# Patient Record
Sex: MALE | Race: WHITE | Employment: FULL TIME | URBAN - METROPOLITAN AREA
[De-identification: names, ages, dates, MRNs, and addresses within clinical notes are randomized per-mention and may not be internally consistent; named-entity substitution may affect disease eponyms.]

---

## 2017-02-20 ENCOUNTER — APPOINTMENT (OUTPATIENT)
Dept: GENERAL RADIOLOGY | Age: 47
End: 2017-02-20
Attending: NEUROLOGICAL SURGERY
Payer: COMMERCIAL

## 2017-02-20 ENCOUNTER — HOSPITAL ENCOUNTER (OUTPATIENT)
Dept: GENERAL RADIOLOGY | Age: 47
Discharge: HOME | End: 2017-02-20
Attending: NEUROLOGICAL SURGERY | Admitting: RADIOLOGY
Payer: COMMERCIAL

## 2017-02-20 ENCOUNTER — APPOINTMENT (OUTPATIENT)
Dept: CT IMAGING | Age: 47
End: 2017-02-20
Attending: NEUROLOGICAL SURGERY
Payer: COMMERCIAL

## 2017-02-20 VITALS
DIASTOLIC BLOOD PRESSURE: 101 MMHG | HEIGHT: 72 IN | BODY MASS INDEX: 32.51 KG/M2 | SYSTOLIC BLOOD PRESSURE: 139 MMHG | TEMPERATURE: 97.8 F | WEIGHT: 240 LBS | HEART RATE: 71 BPM | RESPIRATION RATE: 18 BRPM | OXYGEN SATURATION: 98 %

## 2017-02-20 DIAGNOSIS — M54.12 CERVICAL RADICULOPATHY: ICD-10-CM

## 2017-02-20 PROCEDURE — 62302 MYELOGRAPHY LUMBAR INJECTION: CPT

## 2017-02-20 PROCEDURE — 72040 X-RAY EXAM NECK SPINE 2-3 VW: CPT

## 2017-02-20 PROCEDURE — 74011250637 HC RX REV CODE- 250/637: Performed by: RADIOLOGY

## 2017-02-20 PROCEDURE — 72126 CT NECK SPINE W/DYE: CPT

## 2017-02-20 PROCEDURE — 74011000250 HC RX REV CODE- 250: Performed by: NEUROLOGICAL SURGERY

## 2017-02-20 PROCEDURE — 74011636320 HC RX REV CODE- 636/320: Performed by: NEUROLOGICAL SURGERY

## 2017-02-20 RX ORDER — OXYMETAZOLINE HCL 0.05 %
2 SPRAY, NON-AEROSOL (ML) NASAL
Status: DISCONTINUED | OUTPATIENT
Start: 2017-02-20 | End: 2017-02-20 | Stop reason: HOSPADM

## 2017-02-20 RX ORDER — LIDOCAINE HYDROCHLORIDE 10 MG/ML
1-5 INJECTION INFILTRATION; PERINEURAL
Status: DISCONTINUED | OUTPATIENT
Start: 2017-02-20 | End: 2017-02-20

## 2017-02-20 RX ORDER — DIAZEPAM 5 MG/1
10 TABLET ORAL ONCE
Status: COMPLETED | OUTPATIENT
Start: 2017-02-20 | End: 2017-02-20

## 2017-02-20 RX ORDER — HYDROCODONE BITARTRATE AND ACETAMINOPHEN 7.5; 325 MG/1; MG/1
1 TABLET ORAL
Status: DISCONTINUED | OUTPATIENT
Start: 2017-02-20 | End: 2017-02-20 | Stop reason: HOSPADM

## 2017-02-20 RX ORDER — LIDOCAINE HYDROCHLORIDE 10 MG/ML
1-5 INJECTION, SOLUTION EPIDURAL; INFILTRATION; INTRACAUDAL; PERINEURAL
Status: COMPLETED | OUTPATIENT
Start: 2017-02-20 | End: 2017-02-20

## 2017-02-20 RX ORDER — ACETAMINOPHEN 325 MG/1
650 TABLET ORAL
Status: DISCONTINUED | OUTPATIENT
Start: 2017-02-20 | End: 2017-02-20 | Stop reason: HOSPADM

## 2017-02-20 RX ADMIN — LIDOCAINE HYDROCHLORIDE 5 ML: 10 INJECTION, SOLUTION EPIDURAL; INFILTRATION; INTRACAUDAL; PERINEURAL at 10:28

## 2017-02-20 RX ADMIN — HYDROCODONE BITARTRATE AND ACETAMINOPHEN 1 TABLET: 7.5; 325 TABLET ORAL at 11:54

## 2017-02-20 RX ADMIN — DIAZEPAM 10 MG: 5 TABLET ORAL at 10:08

## 2017-02-20 RX ADMIN — OXYMETAZOLINE HYDROCHLORIDE 2 SPRAY: 5 SPRAY NASAL at 11:45

## 2017-02-20 RX ADMIN — IOPAMIDOL 15 ML: 612 INJECTION, SOLUTION INTRATHECAL at 10:28

## 2017-02-20 RX ADMIN — SODIUM BICARBONATE 5 ML: 0.2 INJECTION, SOLUTION INTRAVENOUS at 10:28

## 2017-02-20 NOTE — IP AVS SNAPSHOT
Sreedhar Mcmillan 
 
 
 [address] [facility] Patient: Ildefonso Vera. MRN: [id number] LSP:[id number] You are allergic to the following No active allergies Recent Documentation Height Weight BMI Smoking Status 1.829 m 108.9 kg 32.55 kg/m2 Current Every Day Smoker Emergency Contacts Name Discharge Info Relation Home Work Mobile Carolina Zhu DISCHARGE CAREGIVER [3] Spouse [3]   [phone] About your hospitalization You were admitted on:  February 20, 2017 You last received care in the:  [address] [place] [address] Road You were discharged on:  February 20, 2017 Unit phone number:  [phone] Why you were hospitalized Your primary diagnosis was:  Not on File Providers Seen During Your Hospitalizations Provider Role Specialty Primary office phone Isatu Macedo MD Attending Provider Neurosurgery [phone] Your Primary Care Physician (PCP) Primary Care Physician Office Phone Office Fax [facility], [address] [place] [address] [phone] Follow-up Information Follow up With Details Comments Contact Info Hank Hernandez MD   [address] [place] [address] [phone] Current Discharge Medication List  
  
CONTINUE these medications which have NOT CHANGED Dose & Instructions Dispensing Information Comments Morning Noon Evening Bedtime  
 amitriptyline 50 mg tablet Commonly known as:  ELAVIL Your next dose is: Today, Tomorrow Other:  _________ Take  by mouth nightly. Refills:  0 HYDROcodone-acetaminophen  mg tablet Commonly known as:  Tab Dolphin Your next dose is: Today, Tomorrow Other:  _________ Dose:  1 Tab Take 1 Tab by mouth. Refills:  0 Discharge Instructions Valium (Diazepam) Discharge instructions You received the narcotic listed above. This medication was given to you to help decrease your anxiety/discomfort and allow you to complete your examination Common Side Effects: 
 
Impairment of memory for up to 24 hours, sleepiness, slurred speech, dizziness, visual disturbance such as blurred vision or difficulty focusing, confusion, euphoria (pain relief), chills, ears feeling blocked or dreaming. It is recommended that you do not drive or operate equipment for at least 24 hours. You should not drink any alcoholic beverages for at least 24 hours. Do not take any other muscle relaxers, sedatives, hypnotics, or mood altering medications for 24 hours unless ordered by your physician who is aware that you received this medication. Call your physician for any questions or problems. He/She may contact this Radiology Department for further information. Marlena Caro RN Myelogram: About This Test 
What is it? A myelogram uses X-rays and a special dye to make pictures of bones and nerves of the spine (spinal canal). The spinal canal holds the spinal cord, the spinal nerve roots, and the fluid-filled space between the bones in your spine. Why is this test done? A myelogram is done to check for: · The cause of arm or leg numbness, weakness, or pain. · Narrowing of the spinal canal (spinal stenosis). · A tumor or infection causing problems with the spinal cord or nerve roots. · A spinal disc that has ruptured (herniated disc). · Inflammation of the membrane that covers the brain and spinal cord. · Problems with the blood vessels to the spine. How can you prepare for the test? 
Your doctor will tell you if you need to change how much you eat and drink before the myelogram. You may be asked to increase the amount of water you drink before the test. Follow the instructions your doctor gives you about eating and drinking. Before a myelogram, tell your doctor if: 
· You are allergic to any medicines, contrast material, or iodine dye. · You have had bleeding problems, or you take a blood thinner, such as aspirin, clopidogrel (Plavix), or warfarin (Coumadin), or you take over-the-counter medicines, such as ibuprofen (Advil, Motrin) or naproxen (Aleve). Your doctor will tell you when you should stop taking these medicines several days before your procedure. Make sure that you understand exactly what he or she wants you to do. · You have had kidney problems. · You have diabetes, especially if you take metformin (Glucophage, for example). · You are or might be pregnant. Ask someone to take you home and stay with you after the test. 
What happens during the test? 
The dye is put into your spinal canal with a thin needle. This is called a lumbar puncture. The dye moves through the space so the nerve roots and spinal cord can be seen more clearly. After the dye is put in, you will lie still while the X-ray pictures are taken. How does it feel? You will feel a quick sting from a small needle that has medicine to numb the skin on your back. You will also feel some pressure as the long, thin spinal needle is put into your spinal canal. You may feel a quick sharp pain down your buttock or leg when the needle is moved in your spine. The dye may make you feel warm and flushed and have a metallic taste in your mouth. What else should you know about the test? 
In rare cases, the hole made by the needle in the sac around the spine does not close normally. This can allow spinal fluid to leak out. This leak may need to be repaired through a procedure called an epidural blood patch. To do the patch, your doctor injects some of your own blood to cover the hole. How long does the test take? · A myelogram usually takes 30 minutes to 1 hour.  
What happens after the test? 
· You will probably be able to go home 30 minutes to 2 hours after the test. 
 · You may need to lie in bed with your head raised for 4 to 24 hours after the test. To prevent seizures, which are a rare side effect, do not bend over or lie down with your head lower than your body. Keeping your head higher than your body after a myelogram also may help prevent or reduce other side effects, such as headache, nausea, or vomiting. · Avoid strenuous activity, such as running or heavy lifting, for at least 1 day after your myelogram. 
· Drink plenty of water after the myelogram. Your doctor will give you instructions on taking your regular medicines. When should you call for help? Call 911 anytime you think you may need emergency care. For example, call if: 
· You have a seizure. Call your doctor now or seek immediate medical care if: 
· You have any increase in pain, weakness, or numbness in your legs. · You have a severe headache or stiff neck, or your eyes become very sensitive to light. · You have a headache that lasts longer than 24 hours. · You have problems urinating or having a bowel movement. · You have a fever. Follow-up care is a key part of your treatment and safety. Be sure to make and go to all appointments, and call your doctor if you are having problems. It's also a good idea to keep a list of the medicines you take. Ask your doctor when you can expect to have your test results. Where can you learn more? Go to http://kumar-skinny.info/. Enter K130 in the search box to learn more about \"Myelogram: About This Test.\" Current as of: August 15, 2016 Content Version: 11.1 © 6017-6421 [employer]. Care instructions adapted under license by [employer] (which disclaims liability or warranty for this information). If you have questions about a medical condition or this instruction, always ask your healthcare professional. [address] any warranty or liability for your use of this information. DISCHARGE SUMMARY from Nurse The following personal items are in your possession at time of discharge: 
 
Dental Appliances: None Visual Aid: None Home Medications: None Jewelry: None Clothing: Shirt, Hat, Pants, Undergarments, Footwear, Socks (all belongings given to wife) Other Valuables: None PATIENT INSTRUCTIONS: 
 
After general anesthesia or intravenous sedation, for 24 hours or while taking prescription Narcotics: · Limit your activities · Do not drive and operate hazardous machinery · Do not make important personal or business decisions · Do  not drink alcoholic beverages · If you have not urinated within 8 hours after discharge, please contact your surgeon on call. Report the following to your surgeon: 
· Excessive pain, swelling, redness or odor of or around the surgical area · Temperature over 100.5 · Nausea and vomiting lasting longer than 4 hours or if unable to take medications · Any signs of decreased circulation or nerve impairment to extremity: change in color, persistent  numbness, tingling, coldness or increase pain · Any questions What to do at Home: These are general instructions for a healthy lifestyle: No smoking/ No tobacco products/ Avoid exposure to second hand smoke Surgeon General's Warning:  Quitting smoking now greatly reduces serious risk to your health. Obesity, smoking, and sedentary lifestyle greatly increases your risk for illness A healthy diet, regular physical exercise & weight monitoring are important for maintaining a healthy lifestyle You may be retaining fluid if you have a history of heart failure or if you experience any of the following symptoms:  Weight gain of 3 pounds or more overnight or 5 pounds in a week, increased swelling in our hands or feet or shortness of breath while lying flat in bed. Please call your doctor as soon as you notice any of these symptoms; do not wait until your next office visit. Recognize signs and symptoms of STROKE: 
 
F-face looks uneven A-arms unable to move or move unevenly S-speech slurred or non-existent T-time-call 911 as soon as signs and symptoms begin-DO NOT go Back to bed or wait to see if you get better-TIME IS BRAIN. Warning Signs of HEART ATTACK Call 911 if you have these symptoms: 
? Chest discomfort. Most heart attacks involve discomfort in the center of the chest that lasts more than a few minutes, or that goes away and comes back. It can feel like uncomfortable pressure, squeezing, fullness, or pain. ? Discomfort in other areas of the upper body. Symptoms can include pain or discomfort in one or both arms, the back, neck, jaw, or stomach. ? Shortness of breath with or without chest discomfort. ? Other signs may include breaking out in a cold sweat, nausea, or lightheadedness. Don't wait more than five minutes to call [address]! Fast action can save your life. Calling 911 is almost always the fastest way to get lifesaving treatment. Emergency Medical Services staff can begin treatment when they arrive  up to an hour sooner than if someone gets to the hospital by car. The discharge information has been reviewed with the patient. The patient verbalized understanding. Discharge medications reviewed with the patient and appropriate educational materials and side effects teaching were provided. Patient armband removed and given to patient to take home. Patient was informed of the privacy risks if armband lost or stolen Discharge Orders None Introducing [facility] SERVICES! [facility] introduces WhistleTalk patient portal. Now you can access parts of your medical record, email your doctor's office, and request medication refills online. 1. In your internet browser, go to [email or web address] 2. Click on the First Time User? Click Here link in the Sign In box.  You will see the New Member Sign Up page. 3. Enter your Krikle Access Code exactly as it appears below. You will not need to use this code after youve completed the sign-up process. If you do not sign up before the expiration date, you must request a new code. · Krikle Access Code: [email or web address] Expires: 5/10/2017 10:33 AM 
 
4. Enter the last four digits of your Social Security Number (xxxx) and Date of Birth (mm/dd/yyyy) as indicated and click Submit. You will be taken to the next sign-up page. 5. Create a Krikle ID. This will be your Krikle login ID and cannot be changed, so think of one that is secure and easy to remember. 6. Create a Krikle password. You can change your password at any time. 7. Enter your Password Reset Question and Answer. This can be used at a later time if you forget your password. 8. Enter your e-mail address. You will receive e-mail notification when new information is available in [address] 9. Click Sign Up. You can now view and download portions of your medical record. 10. Click the Download Summary menu link to download a portable copy of your medical information. If you have questions, please visit the Frequently Asked Questions section of the Krikle website. Remember, Krikle is NOT to be used for urgent needs. For medical emergencies, dial 911. Now available from your iPhone and Android! General Information Please provide this summary of care documentation to your next provider. Patient Signature:  ____________________________________________________________ Date:  ____________________________________________________________  
  
Alexi Sport Provider Signature:  ____________________________________________________________ Date:  ____________________________________________________________

## 2017-02-20 NOTE — PROCEDURES
Vascular & Interventional Radiology Brief Procedure Note    Interventional Radiologist: Elisha Stokes MD    Pre-operative Diagnosis:  Neck Pain and numbness down both arms, left greater then right    Post-operative Diagnosis: Same as pre-op dx    Procedure(s) Performed:  Cervical Myelogram    Anesthesia:  Local    Findings:  Valium 10 mg po given prior to the exam.  22 G spinal needle placed at L 2-3 with fluoroscopic guidance. 10 cc of Isovue 300 M were instilled into the dural sac and advanced to the cervical region with gravity. Cervical myelographic imaging was performed, CT will follow, reported separately.     Complications: None    Estimated Blood Loss:  none    Tubes and Drains: None    Specimens: None    Condition: Good    Disposition:  [address] times 4 hours then home    Elsiha Stokes MD, MD  [address] [address]  Vascular & Interventional Radiology  2/20/2017

## 2017-02-20 NOTE — DISCHARGE INSTRUCTIONS
Valium (Diazepam) Discharge instructions    You received the narcotic listed above. This medication was given to you to help decrease your anxiety/discomfort and allow you to complete your examination       Common Side Effects:    Impairment of memory for up to 24 hours, sleepiness, slurred speech, dizziness, visual disturbance such as blurred vision or difficulty focusing, confusion, euphoria (pain relief), chills, ears feeling blocked or dreaming. It is recommended that you do not drive or operate equipment for at least 24 hours. You should not drink any alcoholic beverages for at least 24 hours. Do not take any other muscle relaxers, sedatives, hypnotics, or mood altering medications for 24 hours unless ordered by your physician who is aware that you received this medication. Call your physician for any questions or problems. He/She may contact this Radiology Department for further information. Jenelle Falk RN           Myelogram: About This Test  What is it? A myelogram uses X-rays and a special dye to make pictures of bones and nerves of the spine (spinal canal). The spinal canal holds the spinal cord, the spinal nerve roots, and the fluid-filled space between the bones in your spine. Why is this test done? A myelogram is done to check for:  · The cause of arm or leg numbness, weakness, or pain. · Narrowing of the spinal canal (spinal stenosis). · A tumor or infection causing problems with the spinal cord or nerve roots. · A spinal disc that has ruptured (herniated disc). · Inflammation of the membrane that covers the brain and spinal cord. · Problems with the blood vessels to the spine. How can you prepare for the test?  Your doctor will tell you if you need to change how much you eat and drink before the myelogram. You may be asked to increase the amount of water you drink before the test. Follow the instructions your doctor gives you about eating and drinking.   Before a myelogram, tell your doctor if:  · You are allergic to any medicines, contrast material, or iodine dye. · You have had bleeding problems, or you take a blood thinner, such as aspirin, clopidogrel (Plavix), or warfarin (Coumadin), or you take over-the-counter medicines, such as ibuprofen (Advil, Motrin) or naproxen (Aleve). Your doctor will tell you when you should stop taking these medicines several days before your procedure. Make sure that you understand exactly what he or she wants you to do. · You have had kidney problems. · You have diabetes, especially if you take metformin (Glucophage, for example). · You are or might be pregnant. Ask someone to take you home and stay with you after the test.  What happens during the test?  The dye is put into your spinal canal with a thin needle. This is called a lumbar puncture. The dye moves through the space so the nerve roots and spinal cord can be seen more clearly. After the dye is put in, you will lie still while the X-ray pictures are taken. How does it feel? You will feel a quick sting from a small needle that has medicine to numb the skin on your back. You will also feel some pressure as the long, thin spinal needle is put into your spinal canal. You may feel a quick sharp pain down your buttock or leg when the needle is moved in your spine. The dye may make you feel warm and flushed and have a metallic taste in your mouth. What else should you know about the test?  In rare cases, the hole made by the needle in the sac around the spine does not close normally. This can allow spinal fluid to leak out. This leak may need to be repaired through a procedure called an epidural blood patch. To do the patch, your doctor injects some of your own blood to cover the hole. How long does the test take? · A myelogram usually takes 30 minutes to 1 hour.   What happens after the test?  · You will probably be able to go home 30 minutes to 2 hours after the test.  · You may need to lie in bed with your head raised for 4 to 24 hours after the test. To prevent seizures, which are a rare side effect, do not bend over or lie down with your head lower than your body. Keeping your head higher than your body after a myelogram also may help prevent or reduce other side effects, such as headache, nausea, or vomiting. · Avoid strenuous activity, such as running or heavy lifting, for at least 1 day after your myelogram.  · Drink plenty of water after the myelogram. Your doctor will give you instructions on taking your regular medicines. When should you call for help? Call 911 anytime you think you may need emergency care. For example, call if:  · You have a seizure. Call your doctor now or seek immediate medical care if:  · You have any increase in pain, weakness, or numbness in your legs. · You have a severe headache or stiff neck, or your eyes become very sensitive to light. · You have a headache that lasts longer than 24 hours. · You have problems urinating or having a bowel movement. · You have a fever. Follow-up care is a key part of your treatment and safety. Be sure to make and go to all appointments, and call your doctor if you are having problems. It's also a good idea to keep a list of the medicines you take. Ask your doctor when you can expect to have your test results. Where can you learn more? Go to http://kumar-skinny.info/. Enter V942 in the search box to learn more about \"Myelogram: About This Test.\"  Current as of: August 15, 2016  Content Version: 11.1  © 0468-9389 [employer]. Care instructions adapted under license by [employer] (which disclaims liability or warranty for this information). If you have questions about a medical condition or this instruction, always ask your healthcare professional. [address] any warranty or liability for your use of this information.     DISCHARGE SUMMARY from Nurse    The following personal items are in your possession at time of discharge:    Dental Appliances: None  Visual Aid: None     Home Medications: None  Jewelry: None  Clothing: Shirt, Hat, Pants, Undergarments, Footwear, Socks (all belongings given to wife)  Other Valuables: None             PATIENT INSTRUCTIONS:    After general anesthesia or intravenous sedation, for 24 hours or while taking prescription Narcotics:  · Limit your activities  · Do not drive and operate hazardous machinery  · Do not make important personal or business decisions  · Do  not drink alcoholic beverages  · If you have not urinated within 8 hours after discharge, please contact your surgeon on call. Report the following to your surgeon:  · Excessive pain, swelling, redness or odor of or around the surgical area  · Temperature over 100.5  · Nausea and vomiting lasting longer than 4 hours or if unable to take medications  · Any signs of decreased circulation or nerve impairment to extremity: change in color, persistent  numbness, tingling, coldness or increase pain  · Any questions        What to do at Home:  These are general instructions for a healthy lifestyle:    No smoking/ No tobacco products/ Avoid exposure to second hand smoke    Surgeon General's Warning:  Quitting smoking now greatly reduces serious risk to your health. Obesity, smoking, and sedentary lifestyle greatly increases your risk for illness    A healthy diet, regular physical exercise & weight monitoring are important for maintaining a healthy lifestyle    You may be retaining fluid if you have a history of heart failure or if you experience any of the following symptoms:  Weight gain of 3 pounds or more overnight or 5 pounds in a week, increased swelling in our hands or feet or shortness of breath while lying flat in bed. Please call your doctor as soon as you notice any of these symptoms; do not wait until your next office visit.     Recognize signs and symptoms of STROKE:    F-face looks uneven    A-arms unable to move or move unevenly    S-speech slurred or non-existent    T-time-call 911 as soon as signs and symptoms begin-DO NOT go       Back to bed or wait to see if you get better-TIME IS BRAIN. Warning Signs of HEART ATTACK     Call 911 if you have these symptoms:   Chest discomfort. Most heart attacks involve discomfort in the center of the chest that lasts more than a few minutes, or that goes away and comes back. It can feel like uncomfortable pressure, squeezing, fullness, or pain.  Discomfort in other areas of the upper body. Symptoms can include pain or discomfort in one or both arms, the back, neck, jaw, or stomach.  Shortness of breath with or without chest discomfort.  Other signs may include breaking out in a cold sweat, nausea, or lightheadedness. Don't wait more than five minutes to call 911 - MINUTES MATTER! Fast action can save your life. Calling 911 is almost always the fastest way to get lifesaving treatment. Emergency Medical Services staff can begin treatment when they arrive -- up to an hour sooner than if someone gets to the hospital by car. The discharge information has been reviewed with the patient. The patient verbalized understanding. Discharge medications reviewed with the patient and appropriate educational materials and side effects teaching were provided. Patient armband removed and given to patient to take home.   Patient was informed of the privacy risks if armband lost or stolen

## 2017-11-08 PROBLEM — M54.12 CERVICAL RADICULAR PAIN: Status: ACTIVE | Noted: 2017-11-08

## 2023-12-13 ENCOUNTER — NEW PATIENT (OUTPATIENT)
Dept: URBAN - METROPOLITAN AREA CLINIC 2 | Facility: CLINIC | Age: 53
End: 2023-12-13

## 2023-12-13 DIAGNOSIS — D23.112: ICD-10-CM

## 2023-12-13 DIAGNOSIS — H43.393: ICD-10-CM

## 2023-12-13 DIAGNOSIS — H25.13: ICD-10-CM

## 2023-12-13 DIAGNOSIS — D23.122: ICD-10-CM

## 2023-12-13 PROCEDURE — 92004 COMPRE OPH EXAM NEW PT 1/>: CPT

## 2023-12-13 PROCEDURE — 92015 DETERMINE REFRACTIVE STATE: CPT

## 2023-12-13 ASSESSMENT — VISUAL ACUITY
OS_SC: 20/20-1
OD_SC: 20/50

## 2023-12-13 ASSESSMENT — KERATOMETRY
OS_K1POWER_DIOPTERS: 42.75
OS_AXISANGLE_DEGREES: 165
OD_AXISANGLE_DEGREES: 180
OD_AXISANGLE2_DEGREES: 90
OS_K2POWER_DIOPTERS: 43.25
OD_K2POWER_DIOPTERS: 43.50
OS_AXISANGLE2_DEGREES: 75
OD_K1POWER_DIOPTERS: 43.50

## 2023-12-13 ASSESSMENT — TONOMETRY
OD_IOP_MMHG: 16
OS_IOP_MMHG: 16

## 2023-12-20 ENCOUNTER — CLINIC PROCEDURE ONLY (OUTPATIENT)
Dept: URBAN - METROPOLITAN AREA CLINIC 2 | Facility: CLINIC | Age: 53
End: 2023-12-20

## 2023-12-20 DIAGNOSIS — D23.112: ICD-10-CM

## 2023-12-20 PROCEDURE — 11441 EXC FACE-MM B9+MARG 0.6-1 CM: CPT

## 2024-12-13 ENCOUNTER — COMPREHENSIVE EXAM (OUTPATIENT)
Age: 54
End: 2024-12-13

## 2024-12-13 DIAGNOSIS — H43.393: ICD-10-CM

## 2024-12-13 DIAGNOSIS — H25.13: ICD-10-CM

## 2024-12-13 DIAGNOSIS — H52.4: ICD-10-CM

## 2024-12-13 PROCEDURE — 92014 COMPRE OPH EXAM EST PT 1/>: CPT

## 2024-12-13 PROCEDURE — 92015 DETERMINE REFRACTIVE STATE: CPT
